# Patient Record
(demographics unavailable — no encounter records)

---

## 2025-01-10 NOTE — HISTORY OF PRESENT ILLNESS
[FreeTextEntry1] : 75 year-old male with no significant PMH presents for evaluation of CP. Patient reports 2-3 months of SSCP reported as tightness lasting 1-2 minutes monthly. Patient reports SOB on stairs. Patient denies palpitations. Patient denies h/o syncope. Patient reports that his father  of sudden death at night at age 76. I advised patient to undergo an echocardiogram and a treadmill stress test.